# Patient Record
Sex: MALE | Race: BLACK OR AFRICAN AMERICAN | NOT HISPANIC OR LATINO | Employment: FULL TIME | URBAN - METROPOLITAN AREA
[De-identification: names, ages, dates, MRNs, and addresses within clinical notes are randomized per-mention and may not be internally consistent; named-entity substitution may affect disease eponyms.]

---

## 2024-06-17 ENCOUNTER — OFFICE VISIT (OUTPATIENT)
Dept: FAMILY MEDICINE CLINIC | Facility: CLINIC | Age: 24
End: 2024-06-17
Payer: COMMERCIAL

## 2024-06-17 VITALS
HEART RATE: 81 BPM | DIASTOLIC BLOOD PRESSURE: 96 MMHG | HEIGHT: 68 IN | BODY MASS INDEX: 21.01 KG/M2 | SYSTOLIC BLOOD PRESSURE: 154 MMHG | WEIGHT: 138.6 LBS | OXYGEN SATURATION: 98 %

## 2024-06-17 DIAGNOSIS — Z13.220 NEED FOR LIPID SCREENING: ICD-10-CM

## 2024-06-17 DIAGNOSIS — Z00.00 ANNUAL PHYSICAL EXAM: Primary | ICD-10-CM

## 2024-06-17 DIAGNOSIS — Z11.59 NEED FOR HEPATITIS C SCREENING TEST: ICD-10-CM

## 2024-06-17 DIAGNOSIS — R30.0 DYSURIA: ICD-10-CM

## 2024-06-17 DIAGNOSIS — R03.0 ELEVATED BLOOD PRESSURE READING: ICD-10-CM

## 2024-06-17 DIAGNOSIS — Z20.2 POSSIBLE EXPOSURE TO STD: ICD-10-CM

## 2024-06-17 DIAGNOSIS — N30.00 ACUTE CYSTITIS WITHOUT HEMATURIA: ICD-10-CM

## 2024-06-17 PROBLEM — J45.909 ASTHMA: Status: ACTIVE | Noted: 2024-06-17

## 2024-06-17 PROBLEM — F12.90 MARIJUANA USER: Status: ACTIVE | Noted: 2024-06-17

## 2024-06-17 LAB
BILIRUB BLD-MCNC: NEGATIVE MG/DL
CLARITY, POC: CLEAR
COLOR UR: YELLOW
EXPIRATION DATE: ABNORMAL
GLUCOSE UR STRIP-MCNC: NEGATIVE MG/DL
KETONES UR QL: NEGATIVE
LEUKOCYTE EST, POC: ABNORMAL
Lab: ABNORMAL
NITRITE UR-MCNC: NEGATIVE MG/ML
PH UR: 6 [PH] (ref 5–8)
PROT UR STRIP-MCNC: ABNORMAL MG/DL
RBC # UR STRIP: ABNORMAL /UL
SP GR UR: 1.02 (ref 1–1.03)
UROBILINOGEN UR QL: ABNORMAL

## 2024-06-17 PROCEDURE — 99385 PREV VISIT NEW AGE 18-39: CPT

## 2024-06-17 PROCEDURE — 81003 URINALYSIS AUTO W/O SCOPE: CPT

## 2024-06-17 RX ORDER — DOXYCYCLINE HYCLATE 100 MG/1
100 CAPSULE ORAL 2 TIMES DAILY
Qty: 14 CAPSULE | Refills: 0 | Status: SHIPPED | OUTPATIENT
Start: 2024-06-17 | End: 2024-06-24

## 2024-06-17 NOTE — PROGRESS NOTES
Subjective   Tony Meyers is a 24 y.o. male.     Chief Complaint   Patient presents with    [facility]    bloodwork     For STD's       .  History of Present Illness     The following portions of the patient's history were reviewed and updated as appropriate: allergies, current medications, past family history, past medical history, past social history, past surgical history and problem list.    Review of Systems   Denies dizziness, fatigue, fever/chills, CP, SOA, headache, blood in urine/stool, abd pain, leg swelling.    Objective     Vitals:    06/17/24 1039   BP: 154/96   Pulse: 81   SpO2: 98%      Body mass index is 21.07 kg/m².    Physical Exam      Assessment & Plan   There are no diagnoses linked to this encounter.  Plan:     ***      Discussed Care Gaps, ordered referrals and encouraged vaccination updates.       - Pt agrees with plan of care and denies further questions/concerns today  - This document is intended for medical expert use only. Persons  reading this document without medical staff guidance may result in misinterpretation and unintended morbidity     Go to the ER for any possible life-threatening symptoms such as chest pain or shortness of air.      Please allow 3-5 business days for recommendations based on new results      I personally spent time with this patient, preparing for the visit, reviewing tests, obtaining and/or reviewing a separately obtained history, performing a medically appropriate examination and/or evaluation, counseling and educating the patient/family/caregiver, ordering medications,  documenting information in the medical record and indepentently interpreting results.

## 2024-06-17 NOTE — PATIENT INSTRUCTIONS
Steps to Quit Smoking  Smoking tobacco is the leading cause of preventable death. It can affect almost every organ in the body. Smoking puts you and people around you at risk for many serious, long-lasting (chronic) diseases. Quitting smoking can be hard, but it is one of the best things that you can do for your health. It is never too late to quit.  Do not give up if you cannot quit the first time. Some people need to try many times to quit. Do your best to stick to your quit plan, and talk with your doctor if you have any questions or concerns.  How do I get ready to quit?  Pick a date to quit. Set a date within the next 2 weeks to give you time to prepare.  Write down the reasons why you are quitting. Keep this list in places where you will see it often.  Tell your family, friends, and co-workers that you are quitting. Their support is important.  Talk with your doctor about the choices that may help you quit.  Find out if your health insurance will pay for these treatments.  Know the people, places, things, and activities that make you want to smoke (triggers). Avoid them.  What first steps can I take to quit smoking?  Throw away all cigarettes at home, at work, and in your car.  Throw away the things that you use when you smoke, such as ashtrays and lighters.  Clean your car. Empty the ashtray.  Clean your home, including curtains and carpets.  What can I do to help me quit smoking?  Talk with your doctor about taking medicines and seeing a counselor. You are more likely to succeed when you do both.  If you are pregnant or breastfeeding:  Talk with your doctor about counseling or other ways to quit smoking.  Do not take medicine to help you quit smoking unless your doctor tells you to.  Quit right away  Quit smoking completely, instead of slowly cutting back on how much you smoke over a period of time. Stopping smoking right away may be more successful than slowly quitting.  Go to counseling. In-person is best  if this is an option. You are more likely to quit if you go to counseling sessions regularly.  Take medicine  You may take medicines to help you quit. Some medicines need a prescription, and some you can buy over-the-counter. Some medicines may contain a drug called nicotine to replace the nicotine in cigarettes. Medicines may:  Help you stop having the desire to smoke (cravings).  Help to stop the problems that come when you stop smoking (withdrawal symptoms).  Your doctor may ask you to use:  Nicotine patches, gum, or lozenges.  Nicotine inhalers or sprays.  Non-nicotine medicine that you take by mouth.  Find resources  Find resources and other ways to help you quit smoking and remain smoke-free after you quit. They include:  Online chats with a counselor.  Phone quitlines.  Printed self-help materials.  Support groups or group counseling.  Text messaging programs.  Mobile phone apps. Use apps on your mobile phone or tablet that can help you stick to your quit plan. Examples of free services include Quit Guide from the CDC and smokefree.gov    What can I do to make it easier to quit?    Talk to your family and friends. Ask them to support and encourage you.  Call a phone quitline, such as 4-800-QUIT-NOW, reach out to support groups, or work with a counselor.  Ask people who smoke to not smoke around you.  Avoid places that make you want to smoke, such as:  Bars.  Parties.  Smoke-break areas at work.  Spend time with people who do not smoke.  Lower the stress in your life. Stress can make you want to smoke. Try these things to lower stress:  Getting regular exercise.  Doing deep-breathing exercises.  Doing yoga.  Meditating.  What benefits will I see if I quit smoking?  Over time, you may have:  A better sense of smell and taste.  Less coughing and sore throat.  A slower heart rate.  Lower blood pressure.  Clearer skin.  Better breathing.  Fewer sick days.  Summary  Quitting smoking can be hard, but it is one of  the best things that you can do for your health.  Do not give up if you cannot quit the first time. Some people need to try many times to quit.  When you decide to quit smoking, make a plan to help you succeed.  Quit smoking right away, not slowly over a period of time.  When you start quitting, get help and support to keep you smoke-free.  This information is not intended to replace advice given to you by your health care provider. Make sure you discuss any questions you have with your health care provider.  Document Revised: 12/09/2022 Document Reviewed: 12/09/2022  Elsevier Patient Education © 2023 [employer]

## 2024-06-17 NOTE — PROGRESS NOTES
"Eliecer Meyers is a 24 y.o. male. Presents today for   Chief Complaint   Patient presents with    Establish Care    bloodwork     For STD's         HPI     History reviewed. No pertinent past medical history.    History reviewed. No pertinent surgical history.     No Known Allergies    No current outpatient medications on file prior to visit.     No current facility-administered medications on file prior to visit.       Social History     Socioeconomic History    Marital status: Single   Tobacco Use    Smoking status: Never    Smokeless tobacco: Never   Vaping Use    Vaping status: Never Used   Substance and Sexual Activity    Drug use: Yes     Types: Marijuana     Occupation/Lives with: works 2 jobs,. Lives with self    Sleep: Gets 6-7 hours of sleep/night    Exercise: He plays basketball, physical job    Nutrition: eats 1 big meal, then snacks during the day    Caffeine: no coffee; no regular sodas; 2 energy drinks/week    Tobb/Vaping/Illicit Drugs/ETOH: Smokes MJ; no vaping; no drugs; drinks tequila on weekends    Sexual hx, STD symptoms (#partners, m/f, bc, LMP): Sexually active, female partners. He has had steady partner, with whom he does not have protected sex. She is pregnant and just tested negative for STDs. He also has another single partner with whom he has had intercourse 2x. States condom was used both times.     Patient states that 1 week ago he began having symptoms of milky white discharge of penis, painful urination, erythema around urethral opening.  He denies history of STDs.  He denies fever, chills, abdominal pain, back pain, hematuria.    Mood: \"Good.\"     Safety: Feels safe at home with the people he/she is around.    Health Maintenance/Labs: Due    Last eye exam: >1 year    Last dentist visit: 1 year ago    Specialists: none  ___________________________  Review of Systems   Denies dizziness, fatigue, fever/chills, CP, SOA, headache, blood in urine/stool, abd pain, leg " "swelling.    Objective   Vitals:    06/17/24 1039   BP: 154/96   Pulse: 81   SpO2: 98%   Weight: 62.9 kg (138 lb 9.6 oz)   Height: 172.7 cm (68\")     Body mass index is 21.07 kg/m².    Physical Exam  Vitals reviewed.   Constitutional:       General: He is not in acute distress.     Appearance: Normal appearance. He is not ill-appearing or toxic-appearing.   HENT:      Right Ear: External ear normal.      Left Ear: External ear normal.      Nose: No congestion or rhinorrhea.      Mouth/Throat:      Mouth: Mucous membranes are moist.      Pharynx: Oropharynx is clear.   Eyes:      Conjunctiva/sclera: Conjunctivae normal.   Cardiovascular:      Rate and Rhythm: Normal rate and regular rhythm.      Pulses: Normal pulses.      Heart sounds: Normal heart sounds.   Pulmonary:      Effort: Pulmonary effort is normal. No respiratory distress.      Breath sounds: Normal breath sounds.   Abdominal:      General: Bowel sounds are normal.      Palpations: Abdomen is soft.      Tenderness: There is no abdominal tenderness. There is no right CVA tenderness or left CVA tenderness.   Genitourinary:     Comments: Deferred per patient request  Lymphadenopathy:      Cervical: No cervical adenopathy.   Skin:     General: Skin is warm and dry.      Capillary Refill: Capillary refill takes less than 2 seconds.   Neurological:      General: No focal deficit present.      Mental Status: He is alert and oriented to person, place, and time.      Motor: No weakness.   Psychiatric:         Behavior: Behavior normal.         Procedures     Assessment & Plan   Diagnoses and all orders for this visit:    1. Annual physical exam (Primary)  -     Comprehensive metabolic panel    2. Possible exposure to STD  -     Chlamydia trachomatis, Neisseria gonorrhoeae, Trichomonas vaginalis, PCR - Urine, Urine, Clean Catch  -     HSV 1 & 2 - Specific Antibody, IgG  -     RPR, Rfx Qn RPR / Confirm TP  -     doxycycline (VIBRAMYCIN) 100 MG capsule; Take 1 " capsule by mouth 2 (Two) Times a Day for 7 days.  Dispense: 14 capsule; Refill: 0    3. Dysuria  -     POCT urinalysis dipstick, automated  -     Urine Culture - Urine, Urine, Clean Catch    4. Need for hepatitis C screening test  -     Hepatitis C Antibody    5. Need for lipid screening  -     Lipid panel    6. Elevated blood pressure reading    7. Acute cystitis without hematuria  -     doxycycline (VIBRAMYCIN) 100 MG capsule; Take 1 capsule by mouth 2 (Two) Times a Day for 7 days.  Dispense: 14 capsule; Refill: 0       Plan:     Complete labs today in office, or as soon as possible  Will treat for UTI, possible chlamydia.  Will call with STD results.   Regarding blood pressure, patient opts to try non-pharmacological interventions x1 month. He will record readings with grandmother's bp cuff. Provider will call him in 1 month to check in.    BMI is within normal parameters. No other follow-up for BMI required.     Return in about 1 year (around 6/17/2025) for Annual physical.     Counseled on a healthy diet. Use condoms 100% of time with sex as IUD does not protect against STIs. Eat a healthy diet and exercise routinely. Avoid smoking/alcohol and drugs. Use seatbelt 100% of time.     Discussed Care Gaps, ordered referrals and encouraged vaccination updates.       - Pt agrees with plan of care and denies further questions/concerns today  - This document is intended for medical expert use only. Persons  reading this document without medical staff guidance may result in misinterpretation and unintended morbidity     Go to the ER for any possible life-threatening symptoms such as chest pain or shortness of air.      Please allow 3-5 business days for recommendations based on new results      I personally spent time with this patient, preparing for the visit, reviewing tests, obtaining and/or reviewing a separately obtained history, performing a medically appropriate examination and/or evaluation, counseling and  educating the patient/family/caregiver, ordering medications,  documenting information in the medical record and indepentently interpreting results.

## 2024-06-18 LAB
ALBUMIN SERPL-MCNC: 4.9 G/DL (ref 4.3–5.2)
ALP SERPL-CCNC: 82 IU/L (ref 44–121)
ALT SERPL-CCNC: 13 IU/L (ref 0–44)
AST SERPL-CCNC: 24 IU/L (ref 0–40)
BILIRUB SERPL-MCNC: 0.6 MG/DL (ref 0–1.2)
BUN SERPL-MCNC: 13 MG/DL (ref 6–20)
BUN/CREAT SERPL: 11 (ref 9–20)
C TRACH RRNA SPEC QL NAA+PROBE: NEGATIVE
CALCIUM SERPL-MCNC: 9.9 MG/DL (ref 8.7–10.2)
CHLORIDE SERPL-SCNC: 104 MMOL/L (ref 96–106)
CHOLEST SERPL-MCNC: 147 MG/DL (ref 100–199)
CO2 SERPL-SCNC: 21 MMOL/L (ref 20–29)
CREAT SERPL-MCNC: 1.22 MG/DL (ref 0.76–1.27)
EGFRCR SERPLBLD CKD-EPI 2021: 84 ML/MIN/1.73
GLOBULIN SER CALC-MCNC: 2.9 G/DL (ref 1.5–4.5)
GLUCOSE SERPL-MCNC: 95 MG/DL (ref 70–99)
HCV IGG SERPL QL IA: NON REACTIVE
HDLC SERPL-MCNC: 69 MG/DL
HSV1 IGG SER IA-ACNC: <0.91 INDEX (ref 0–0.9)
HSV2 IGG SER IA-ACNC: <0.91 INDEX (ref 0–0.9)
LDLC SERPL CALC-MCNC: 66 MG/DL (ref 0–99)
N GONORRHOEA RRNA SPEC QL NAA+PROBE: POSITIVE
POTASSIUM SERPL-SCNC: 4.4 MMOL/L (ref 3.5–5.2)
PROT SERPL-MCNC: 7.8 G/DL (ref 6–8.5)
RPR SER QL: NON REACTIVE
SODIUM SERPL-SCNC: 140 MMOL/L (ref 134–144)
T VAGINALIS RRNA SPEC QL NAA+PROBE: NEGATIVE
TRIGL SERPL-MCNC: 54 MG/DL (ref 0–149)
VLDLC SERPL CALC-MCNC: 12 MG/DL (ref 5–40)

## 2024-06-19 ENCOUNTER — CLINICAL SUPPORT (OUTPATIENT)
Dept: FAMILY MEDICINE CLINIC | Facility: CLINIC | Age: 24
End: 2024-06-19
Payer: COMMERCIAL

## 2024-06-19 DIAGNOSIS — A54.9 GONORRHEA: Primary | ICD-10-CM

## 2024-06-19 LAB
BACTERIA UR CULT: NO GROWTH
BACTERIA UR CULT: NORMAL

## 2024-06-19 PROCEDURE — 96372 THER/PROPH/DIAG INJ SC/IM: CPT

## 2024-06-19 RX ORDER — CEFTRIAXONE 1 G/1
1 INJECTION, POWDER, FOR SOLUTION INTRAMUSCULAR; INTRAVENOUS EVERY 24 HOURS
Status: COMPLETED | OUTPATIENT
Start: 2024-06-19 | End: 2024-06-19

## 2024-06-19 RX ADMIN — CEFTRIAXONE 1 G: 1 INJECTION, POWDER, FOR SOLUTION INTRAMUSCULAR; INTRAVENOUS at 15:39

## 2025-03-03 ENCOUNTER — OFFICE VISIT (OUTPATIENT)
Dept: FAMILY MEDICINE CLINIC | Facility: CLINIC | Age: 25
End: 2025-03-03
Payer: COMMERCIAL

## 2025-03-03 VITALS
WEIGHT: 140.6 LBS | SYSTOLIC BLOOD PRESSURE: 130 MMHG | HEIGHT: 68 IN | OXYGEN SATURATION: 97 % | HEART RATE: 58 BPM | BODY MASS INDEX: 21.31 KG/M2 | DIASTOLIC BLOOD PRESSURE: 70 MMHG

## 2025-03-03 DIAGNOSIS — R03.0 BLOOD PRESSURE ELEVATED WITHOUT HISTORY OF HTN: Primary | ICD-10-CM

## 2025-03-03 DIAGNOSIS — Z86.19 HISTORY OF GONORRHEA: ICD-10-CM

## 2025-03-03 PROCEDURE — 99213 OFFICE O/P EST LOW 20 MIN: CPT

## 2025-03-03 NOTE — PROGRESS NOTES
Subjective   Tony Meyers is a 24 y.o. male.     Patient or patient representative verbalized consent for the use of Ambient Listening during the visit with  VAL Moody for chart documentation. 3/3/2025  10:58 EST    Chief Complaint   Patient presents with    Hypertension       History of Present Illness  The patient is a 24-year-old male who presents for a follow-up visit.    Elevated Blood Pressure and Headaches  He was last evaluated in 08/2024, during which his blood pressure was elevated at 154/96. He has been experiencing intermittent headaches, with a recent episode lasting 3 consecutive days. However, he reports no visual disturbances, respiratory difficulties, or peripheral edema. He has been monitoring his blood pressure at work, which has consistently measured between 140 and 150. He possesses a home blood pressure monitor but does not use it frequently. He has implemented lifestyle modifications, including regular exercise and dietary changes such as reducing salt intake, limiting consumption of honey buns, and incorporating more fruits into his diet. His exercise regimen includes daily push-ups, squats, and treadmill workouts.  - Onset: Last evaluated in 08/2024 with elevated blood pressure; recent headache episode lasting 3 consecutive days.  - Location: Head (for headaches).  - Duration: Intermittent headaches; recent episode lasted 3 days.  - Character: Elevated blood pressure; intermittent headaches.  - Alleviating/Aggravating Factors: Lifestyle modifications (exercise, dietary changes).  - Timing: Blood pressure consistently measured between 140 and 150 at work.  - Severity: Blood pressure elevated at 154/96; headaches lasting 3 days.    Asthma History  He had asthma as a child but believes he has grown out of it. He has not seen a pulmonologist in the past and does not feel like he needs help getting air in.    Past Treatment for Chlamydia  He was treated for Chlamydia in the  past.    IMMUNIZATIONS  - Declined influenza, COVID-19, pneumonia, and Tdap vaccines       The following portions of the patient's history were reviewed and updated as appropriate: allergies, current medications, past family history, past medical history, past social history, past surgical history and problem list.    Results         Objective     Vitals:    03/03/25 1100   BP: 130/70   Pulse: 58   SpO2: 97%      Body mass index is 21.38 kg/m².    Physical Exam  Vitals reviewed.   Constitutional:       Appearance: Normal appearance.   Cardiovascular:      Rate and Rhythm: Normal rate and regular rhythm.   Pulmonary:      Effort: Pulmonary effort is normal.   Skin:     General: Skin is warm and dry.   Neurological:      Mental Status: He is alert and oriented to person, place, and time.   Psychiatric:         Behavior: Behavior normal.         Assessment & Plan  1. Elevated blood pressure  - Significant improvement to 130/70 from previous 154/96  - No symptoms such as chest pain, dizziness, or vision changes  - Managing condition through regular exercise and dietary modifications (reducing salt intake, consuming more fruits)  - Not on any medications  - Advised to continue current lifestyle modifications  - Further evaluation if severe headaches or frequent chest pain occur    2. History of asthma  - Had asthma as a child, no current symptoms (shortness of breath, difficulty breathing)  - No medication or referral to a pulmonologist needed    3. Gonorrhea  - Urine sample to be collected today to ensure clearance of gonorrhea (treated in the past)    Follow-up  - Patient to follow up in 6 months for a physical examination       Discussed Care Gaps, ordered referrals and encouraged vaccination updates.       - Pt agrees with plan of care and denies further questions/concerns today  - This document is intended for medical expert use only. Persons  reading this document without medical staff guidance may result in  misinterpretation and unintended morbidity     Go to the ER for any possible life-threatening symptoms such as chest pain or shortness of air.      Please allow 3-5 business days for recommendations based on new results      I personally spent time with this patient, preparing for the visit, reviewing tests, obtaining and/or reviewing a separately obtained history, performing a medically appropriate examination and/or evaluation, counseling and educating the patient/family/caregiver, ordering medications,  documenting information in the medical record and indepentently interpreting results.

## 2025-03-03 NOTE — PATIENT INSTRUCTIONS
Steps to Quit Smoking  Smoking tobacco is the leading cause of preventable death. It can affect almost every organ in the body. Smoking puts you and people around you at risk for many serious, long-lasting (chronic) diseases. Quitting smoking can be hard, but it is one of the best things that you can do for your health. It is never too late to quit.  Do not give up if you cannot quit the first time. Some people need to try many times to quit. Do your best to stick to your quit plan, and talk with your doctor if you have any questions or concerns.  How do I get ready to quit?  Pick a date to quit. Set a date within the next 2 weeks to give you time to prepare.  Write down the reasons why you are quitting. Keep this list in places where you will see it often.  Tell your family, friends, and co-workers that you are quitting. Their support is important.  Talk with your doctor about the choices that may help you quit.  Find out if your health insurance will pay for these treatments.  Know the people, places, things, and activities that make you want to smoke (triggers). Avoid them.  What first steps can I take to quit smoking?  Throw away all cigarettes at home, at work, and in your car.  Throw away the things that you use when you smoke, such as ashtrays and lighters.  Clean your car. Empty the ashtray.  Clean your home, including curtains and carpets.  What can I do to help me quit smoking?  Talk with your doctor about taking medicines and seeing a counselor. You are more likely to succeed when you do both.  If you are pregnant or breastfeeding:  Talk with your doctor about counseling or other ways to quit smoking.  Do not take medicine to help you quit smoking unless your doctor tells you to.  Quit right away  Quit smoking completely, instead of slowly cutting back on how much you smoke over a period of time. Stopping smoking right away may be more successful than slowly quitting.  Go to counseling. In-person is best  if this is an option. You are more likely to quit if you go to counseling sessions regularly.  Take medicine  You may take medicines to help you quit. Some medicines need a prescription, and some you can buy over-the-counter. Some medicines may contain a drug called nicotine to replace the nicotine in cigarettes. Medicines may:  Help you stop having the desire to smoke (cravings).  Help to stop the problems that come when you stop smoking (withdrawal symptoms).  Your doctor may ask you to use:  Nicotine patches, gum, or lozenges.  Nicotine inhalers or sprays.  Non-nicotine medicine that you take by mouth.  Find resources  Find resources and other ways to help you quit smoking and remain smoke-free after you quit. They include:  Online chats with a counselor.  Phone quitlines.  Printed self-help materials.  Support groups or group counseling.  Text messaging programs.  Mobile phone apps. Use apps on your mobile phone or tablet that can help you stick to your quit plan. Examples of free services include Quit Guide from the CDC and smokefree.gov    What can I do to make it easier to quit?    Talk to your family and friends. Ask them to support and encourage you.  Call a phone quitline, such as 9-800-QUIT-NOW, reach out to support groups, or work with a counselor.  Ask people who smoke to not smoke around you.  Avoid places that make you want to smoke, such as:  Bars.  Parties.  Smoke-break areas at work.  Spend time with people who do not smoke.  Lower the stress in your life. Stress can make you want to smoke. Try these things to lower stress:  Getting regular exercise.  Doing deep-breathing exercises.  Doing yoga.  Meditating.  What benefits will I see if I quit smoking?  Over time, you may have:  A better sense of smell and taste.  Less coughing and sore throat.  A slower heart rate.  Lower blood pressure.  Clearer skin.  Better breathing.  Fewer sick days.  Summary  Quitting smoking can be hard, but it is one of  the best things that you can do for your health.  Do not give up if you cannot quit the first time. Some people need to try many times to quit.  When you decide to quit smoking, make a plan to help you succeed.  Quit smoking right away, not slowly over a period of time.  When you start quitting, get help and support to keep you smoke-free.  This information is not intended to replace advice given to you by your health care provider. Make sure you discuss any questions you have with your health care provider.  Document Revised: 12/09/2022 Document Reviewed: 12/09/2022  P[employer] Patient Education © 2023 [employer] Managing Your Hypertension  Hypertension, also called high blood pressure, is when the force of the blood pressing against the walls of the arteries is too strong. Arteries are blood vessels that carry blood from your heart throughout your body. Hypertension forces the heart to work harder to pump blood and may cause the arteries to become narrow or stiff.  Understanding blood pressure readings  A blood pressure reading includes a higher number over a lower number:  The first, or top, number is called the systolic pressure. It is a measure of the pressure in your arteries as your heart beats.  The second, or bottom number, is called the diastolic pressure. It is a measure of the pressure in your arteries as the heart relaxes.  For most people, a normal blood pressure is below 120/80. Your personal target blood pressure may vary depending on your medical conditions, your age, and other factors.  Blood pressure is classified into four stages. Based on your blood pressure reading, your health care provider may use the following stages to determine what type of treatment you need, if any. Systolic pressure and diastolic pressure are measured in a unit called millimeters of mercury (mmHg).  Normal  Systolic pressure: below 120.  Diastolic pressure: below 80.  Elevated  Systolic pressure: 120-129.  Diastolic  pressure: below 80.  Hypertension stage 1  Systolic pressure: 130-139.  Diastolic pressure: 80-89.  Hypertension stage 2  Systolic pressure: 140 or above.  Diastolic pressure: 90 or above.  How can this condition affect me?  Managing your hypertension is very important. Over time, hypertension can damage the arteries and decrease blood flow to parts of the body, including the brain, heart, and kidneys. Having untreated or uncontrolled hypertension can lead to:  A heart attack.  A stroke.  A weakened blood vessel (aneurysm).  Heart failure.  Kidney damage.  Eye damage.  Memory and concentration problems.  Vascular dementia.  What actions can I take to manage this condition?  Hypertension can be managed by making lifestyle changes and possibly by taking medicines. Your health care provider will help you make a plan to bring your blood pressure within a normal range. You may be referred for counseling on a healthy diet and physical activity.  Nutrition    Eat a diet that is high in fiber and potassium, and low in salt (sodium), added sugar, and fat. An example eating plan is called the DASH diet. DASH stands for Dietary Approaches to Stop Hypertension. To eat this way:  Eat plenty of fresh fruits and vegetables. Try to fill one-half of your plate at each meal with fruits and vegetables.  Eat whole grains, such as whole-wheat pasta, brown rice, or whole-grain bread. Fill about one-fourth of your plate with whole grains.  Eat low-fat dairy products.  Avoid fatty cuts of meat, processed or cured meats, and poultry with skin. Fill about one-fourth of your plate with lean proteins such as fish, chicken without skin, beans, eggs, and tofu.  Avoid pre-made and processed foods. These tend to be higher in sodium, added sugar, and fat.  Reduce your daily sodium intake. Many people with hypertension should eat less than 1,500 mg of sodium a day.  Lifestyle    Work with your health care provider to maintain a healthy body weight  or to lose weight. Ask what an ideal weight is for you.  Get at least 30 minutes of exercise that causes your heart to beat faster (aerobic exercise) most days of the week. Activities may include walking, swimming, or biking.  Include exercise to strengthen your muscles (resistance exercise), such as weight lifting, as part of your weekly exercise routine. Try to do these types of exercises for 30 minutes at least 3 days a week.  Do not use any products that contain nicotine or tobacco. These products include cigarettes, chewing tobacco, and vaping devices, such as e-cigarettes. If you need help quitting, ask your health care provider.  Control any long-term (chronic) conditions you have, such as high cholesterol or diabetes.  Identify your sources of stress and find ways to manage stress. This may include meditation, deep breathing, or making time for fun activities.  Alcohol use  Do not drink alcohol if:  Your health care provider tells you not to drink.  You are pregnant, may be pregnant, or are planning to become pregnant.  If you drink alcohol:  Limit how much you have to:  0-1 drink a day for women.  0-2 drinks a day for men.  Know how much alcohol is in your drink. In the [place] one drink equals one 12 oz bottle of beer (355 mL), one 5 oz glass of wine (148 mL), or one 1½ oz glass of hard liquor (44 mL).  Medicines  Your health care provider may prescribe medicine if lifestyle changes are not enough to get your blood pressure under control and if:  Your systolic blood pressure is 130 or higher.  Your diastolic blood pressure is 80 or higher.  Take medicines only as told by your health care provider. Follow the directions carefully. Blood pressure medicines must be taken as told by your health care provider. The medicine does not work as well when you skip doses. Skipping doses also puts you at risk for problems.  Monitoring  Before you monitor your blood pressure:  Do not smoke, drink caffeinated beverages, or  exercise within 30 minutes before taking a measurement.  Use the bathroom and empty your bladder (urinate).  Sit quietly for at least 5 minutes before taking measurements.  Monitor your blood pressure at home as told by your health care provider. To do this:  Sit with your back straight and supported.  Place your feet flat on the floor. Do not cross your legs.  Support your arm on a flat surface, such as a table. Make sure your upper arm is at heart level.  Each time you measure, take two or three readings one minute apart and record the results.  You may also need to have your blood pressure checked regularly by your health care provider.  General information  Talk with your health care provider about your diet, exercise habits, and other lifestyle factors that may be contributing to hypertension.  Review all the medicines you take with your health care provider because there may be side effects or interactions.  Keep all follow-up visits. Your health care provider can help you create and adjust your plan for managing your high blood pressure.  Where to find more information  National Heart, Lung, and [facility] [place]: www.nhlbi.nih.gov  American Heart Association: www.heart.org  Contact a health care provider if:  You think you are having a reaction to medicines you have taken.  You have repeated (recurrent) headaches.  You feel dizzy.  You have swelling in your ankles.  You have trouble with your vision.  Get help right away if:  You develop a severe headache or confusion.  You have unusual weakness or numbness, or you feel faint.  You have severe pain in your chest or abdomen.  You vomit repeatedly.  You have trouble breathing.  These symptoms may be an emergency. Get help right away. Call 911.  Do not wait to see if the symptoms will go away.  Do not drive yourself to the hospital.  Summary  Hypertension is when the force of blood pumping through your arteries is too strong. If this condition is not controlled,  it may put you at risk for serious complications.  Your personal target blood pressure may vary depending on your medical conditions, your age, and other factors. For most people, a normal blood pressure is less than 120/80.  Hypertension is managed by lifestyle changes, medicines, or both.  Lifestyle changes to help manage hypertension include losing weight, eating a healthy, low-sodium diet, exercising more, stopping smoking, and limiting alcohol.  This information is not intended to replace advice given to you by your health care provider. Make sure you discuss any questions you have with your health care provider.  Document Revised: 09/01/2022 Document Reviewed: 09/01/2022  [employer] Patient Education © 2023 [employer].    Blood Pressure Record Sheet  To take your blood pressure, you will need a blood pressure machine. You may be prescribed one, or you can buy a blood pressure machine (blood pressure monitor) at your clinic, drug store, or online. When choosing one, look for these features:  An automatic monitor that has an arm cuff.  A cuff that wraps snugly, but not too tightly, around your upper arm. You should be able to fit only one finger between your arm and the cuff.  A device that stores blood pressure reading results.  Do not choose a monitor that measures your blood pressure from your wrist or finger.  Follow your health care provider's instructions for how to take your blood pressure. To use this form:  Get one reading in the morning (a.m.) before you take any medicines.  Get one reading in the evening (p.m.) before supper.  Take at least two readings with each blood pressure check. This makes sure the results are correct. Wait 1-2 minutes between measurements.  Write down the results in the spaces on this form.  Repeat this once a week, or as told by your health care provider.  Make a follow-up appointment with your health care provider to discuss the results.  Blood pressure log  Date:  _______________________  a.m. _____________________(1st reading) _____________________(2nd reading)  p.m. _____________________(1st reading) _____________________(2nd reading)  Date: _______________________  a.m. _____________________(1st reading) _____________________(2nd reading)  p.m. _____________________(1st reading) _____________________(2nd reading)  Date: _______________________  a.m. _____________________(1st reading) _____________________(2nd reading)  p.m. _____________________(1st reading) _____________________(2nd reading)  Date: _______________________  a.m. _____________________(1st reading) _____________________(2nd reading)  p.m. _____________________(1st reading) _____________________(2nd reading)  Date: _______________________  a.m. _____________________(1st reading) _____________________(2nd reading)  p.m. _____________________(1st reading) _____________________(2nd reading)  This information is not intended to replace advice given to you by your health care provider. Make sure you discuss any questions you have with your health care provider.  Document Revised: 09/01/2022 Document Reviewed: 09/01/2022  Elsevier Patient Education © 2023 [employer] Inc.

## 2025-03-05 LAB
C TRACH RRNA SPEC QL NAA+PROBE: NEGATIVE
N GONORRHOEA RRNA SPEC QL NAA+PROBE: NEGATIVE
T VAGINALIS RRNA SPEC QL NAA+PROBE: NEGATIVE

## 2025-05-02 ENCOUNTER — OFFICE VISIT (OUTPATIENT)
Dept: FAMILY MEDICINE CLINIC | Facility: CLINIC | Age: 25
End: 2025-05-02
Payer: COMMERCIAL

## 2025-05-02 VITALS
HEIGHT: 68 IN | BODY MASS INDEX: 21.16 KG/M2 | HEART RATE: 71 BPM | DIASTOLIC BLOOD PRESSURE: 84 MMHG | SYSTOLIC BLOOD PRESSURE: 140 MMHG | WEIGHT: 139.6 LBS | OXYGEN SATURATION: 99 %

## 2025-05-02 DIAGNOSIS — R03.0 ELEVATED BLOOD PRESSURE READING: ICD-10-CM

## 2025-05-02 DIAGNOSIS — Z91.89 AT RISK FOR SEXUALLY TRANSMITTED DISEASE DUE TO UNPROTECTED SEX: Primary | ICD-10-CM

## 2025-05-02 DIAGNOSIS — R30.0 BURNING WITH URINATION: ICD-10-CM

## 2025-05-02 LAB
BILIRUB BLD-MCNC: NEGATIVE MG/DL
CLARITY, POC: CLEAR
COLOR UR: YELLOW
GLUCOSE UR STRIP-MCNC: NEGATIVE MG/DL
KETONES UR QL: NEGATIVE
LEUKOCYTE EST, POC: NEGATIVE
NITRITE UR-MCNC: NEGATIVE MG/ML
PH UR: 6 [PH] (ref 5–8)
PROT UR STRIP-MCNC: NEGATIVE MG/DL
RBC # UR STRIP: NEGATIVE /UL
SP GR UR: 1.02 (ref 1–1.03)
UROBILINOGEN UR QL: NORMAL

## 2025-05-02 NOTE — PROGRESS NOTES
Subjective   Tony Meyers is a 24 y.o. male.     Patient or patient representative verbalized consent for the use of Ambient Listening during the visit with  VAL Moody for chart documentation. 5/2/2025  15:06 EDT    Chief Complaint   Patient presents with    Exposure to STD     Wants testing     History of Present Illness  The patient is a 24-year-old male who presents for evaluation of suspected sexually transmitted disease and elevated blood pressure.    Suspected Sexually Transmitted Disease  He reports engaging in unprotected sexual intercourse with a new partner on 04/25/2025, following which he experienced dysuria, or painful urination, on 04/27/2025. The dysuria persisted for two days but has since resolved. No associated symptoms such as genital pain, swelling, lumps, bumps, or discharge are reported. Systemic symptoms such as fever or fatigue are also not reported. A past medical history of gonorrhea is noted, but he has never had a urinary tract infection.  - Onset: Dysuria started on 04/27/2025 after unprotected sexual intercourse on 04/25/2025.  - Duration: Dysuria persisted for two days.  - Character: Painful urination.  - Severity: Resolved dysuria; no associated symptoms or systemic symptoms.    Elevated Blood Pressure  Blood pressure was elevated during today's visit. He recalls a previous discussion about his hypertension, during which he was informed that medication might be necessary. In response, physical activity was increased and sodium intake was reduced, resulting in a decrease in blood pressure from 150 to 130. However, due to recent lifestyle changes, including taking on a second job and frequent dining out, old habits have resurfaced and blood pressure has increased to 140. A period of inadequate sleep, averaging only three hours per night for a month, is acknowledged, and plans to resume healthier habits are mentioned. More physical activity, including walking and running with  his dog, is intended to be incorporated into his routine.  - Onset: Previous discussion about hypertension; recent lifestyle changes.  - Duration: Period of inadequate sleep for a month.  - Character: Elevated blood pressure.  - Alleviating/Aggravating Factors: Increased physical activity and reduced sodium intake initially helped; recent lifestyle changes and inadequate sleep worsened it.  - Severity: Blood pressure increased to 140.    Low Back Pain  No abdominal pain is reported, but mild low back pain is noted, which is attributed to prolonged standing at his new job. This back pain was first noticed approximately two weeks ago.  - Onset: First noticed approximately two weeks ago.  - Location: Low back.  - Character: Mild pain.  - Aggravating Factors: Prolonged standing at his new job.    SOCIAL HISTORY  - Admits to smoking     The following portions of the patient's history were reviewed and updated as appropriate: allergies, current medications, past family history, past medical history, past social history, past surgical history and problem list.    Results  - Labs:    - Urine dip: Negative     Objective     Vitals:    05/02/25 1453   BP: 140/84   Pulse: 71   SpO2: 99%      Body mass index is 21.23 kg/m².    Physical Exam  Vitals reviewed.   Constitutional:       General: He is not in acute distress.     Appearance: Normal appearance. He is not ill-appearing or toxic-appearing.   Eyes:      Conjunctiva/sclera: Conjunctivae normal.   Cardiovascular:      Rate and Rhythm: Normal rate.   Pulmonary:      Effort: Pulmonary effort is normal.   Musculoskeletal:         General: Normal range of motion.   Skin:     General: Skin is warm and dry.   Neurological:      Mental Status: He is alert and oriented to person, place, and time.   Psychiatric:         Behavior: Behavior normal.         Assessment & Plan  1. Suspected sexually transmitted disease  - Symptoms: burning during urination after unprotected sex with a  new partner, lasting for two days but resolved  - Urine dip test to check for white blood cells  - Send-out test to screen for chlamydia, gonorrhea, and trichomonas  - Advised to use condoms with new partners to prevent future STDs  - Appropriate treatment to be initiated by the beginning of next week if tests return positive    2. Elevated blood pressure  - Blood pressure high during this visit and consistently elevated in previous visits  - Previously decreased from 150 to 130 with lifestyle changes but recently increased due to poor habits (eating out, lack of sleep)  - Advised to monitor blood pressure, reduce sodium intake, increase physical activity, and cut back on smoking  - No medication prescribed due to young age and potential for lifestyle modifications to manage blood pressure       Discussed Care Gaps, ordered referrals and encouraged vaccination updates.       - Pt agrees with plan of care and denies further questions/concerns today  - This document is intended for medical expert use only. Persons  reading this document without medical staff guidance may result in misinterpretation and unintended morbidity     Go to the ER for any possible life-threatening symptoms such as chest pain or shortness of air.      Please allow 3-5 business days for recommendations based on new results      I personally spent time with this patient, preparing for the visit, reviewing tests, obtaining and/or reviewing a separately obtained history, performing a medically appropriate examination and/or evaluation, counseling and educating the patient/family/caregiver, ordering medications,  documenting information in the medical record and indepentently interpreting results.

## 2025-06-17 ENCOUNTER — TELEPHONE (OUTPATIENT)
Dept: FAMILY MEDICINE CLINIC | Facility: CLINIC | Age: 25
End: 2025-06-17
Payer: COMMERCIAL

## 2025-06-17 NOTE — TELEPHONE ENCOUNTER
OK FOR HUB TO RELAY:     (Lvm and mychart message. You have an appointment with Yenifer tomorrow. Yenifer is supposed to be out on maternity leave, but there is a chance she will work tomorrow if she does not go into labor. I can keep your appointment with Yenifer in case she comes in, or we can go ahead and reschedule you to see VAL Mayberry)